# Patient Record
Sex: MALE | Race: OTHER | NOT HISPANIC OR LATINO | URBAN - METROPOLITAN AREA
[De-identification: names, ages, dates, MRNs, and addresses within clinical notes are randomized per-mention and may not be internally consistent; named-entity substitution may affect disease eponyms.]

---

## 2019-06-04 ENCOUNTER — EMERGENCY (EMERGENCY)
Facility: HOSPITAL | Age: 34
LOS: 1 days | Discharge: HOME | End: 2019-06-04
Attending: EMERGENCY MEDICINE | Admitting: EMERGENCY MEDICINE
Payer: SELF-PAY

## 2019-06-04 VITALS
DIASTOLIC BLOOD PRESSURE: 84 MMHG | TEMPERATURE: 98 F | OXYGEN SATURATION: 96 % | SYSTOLIC BLOOD PRESSURE: 143 MMHG | RESPIRATION RATE: 18 BRPM | HEART RATE: 112 BPM
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
## 2019-06-04 DIAGNOSIS — F10.129 ALCOHOL ABUSE WITH INTOXICATION, UNSPECIFIED: ICD-10-CM

## 2019-06-04 DIAGNOSIS — R41.82 ALTERED MENTAL STATUS, UNSPECIFIED: ICD-10-CM

## 2019-06-04 LAB — ETHANOL SERPL-MCNC: 229 MG/DL — HIGH

## 2019-06-04 PROCEDURE — 99053 MED SERV 10PM-8AM 24 HR FAC: CPT

## 2019-06-04 PROCEDURE — 99220: CPT | Mod: 25

## 2019-06-04 NOTE — ED PROVIDER NOTE - CLINICAL SUMMARY MEDICAL DECISION MAKING FREE TEXT BOX
34 y/o male BIB EMS for ETOH intoxication. Pt was picked up from outside a restaurant. Admits to heavy ETOH use today, no other complaints. Denies any fall or trauma. Unable to cooperate with remainder of history due to clinical condition/AMS.  Unable to walk or stand without assistance.  Will place in CDU for patient safety and offer SBIRT when sober.

## 2019-06-04 NOTE — ED CDU PROVIDER INITIAL DAY NOTE - OBJECTIVE STATEMENT
32 y/o male BIB EMS for ETOH intoxication. Pt was picked up from outside a restaurant. Admits to heavy ETOH use today, no other complaints. Denies any fall or trauma. Unable to cooperate with remainder of history due to clinical condition/AMS.

## 2019-06-04 NOTE — ED PROVIDER NOTE - OBJECTIVE STATEMENT
34 y/o male BIB EMS for ETOH intoxication. Pt was picked up from outside a restaurant. Admits to heavy ETOH use today, no other complaints. Denies any fall or trauma. Unable to cooperate with remainder of history due to clinical condition/AMS.

## 2019-06-04 NOTE — ED ADULT TRIAGE NOTE - CHIEF COMPLAINT QUOTE
Pt. picked up from outside a restaurant admits to drinking alcohol. In no distress, no s/s of fall or trauma

## 2019-06-04 NOTE — ED ADULT NURSE NOTE - CHPI ED NUR SYMPTOMS NEG
no disorientation/no nausea/no pain/no fever/no abdominal pain/no vomiting/no chills/no confusion/no weakness/no abdominal distension

## 2019-06-04 NOTE — ED ADULT NURSE NOTE - NSIMPLEMENTINTERV_GEN_ALL_ED
Implemented All Fall Risk Interventions:  [place] to call system. Call bell, personal items and telephone within reach. Instruct patient to call for assistance. Room bathroom lighting operational. Non-slip footwear when patient is off stretcher. Physically safe environment: no spills, clutter or unnecessary equipment. Stretcher in lowest position, wheels locked, appropriate side rails in place. Provide visual cue, wrist band, yellow gown, etc. Monitor gait and stability. Monitor for mental status changes and reorient to person, place, and time. Review medications for side effects contributing to fall risk. Reinforce activity limits and safety measures with patient and family.

## 2019-06-05 VITALS
RESPIRATION RATE: 17 BRPM | OXYGEN SATURATION: 96 % | HEART RATE: 90 BPM | DIASTOLIC BLOOD PRESSURE: 95 MMHG | SYSTOLIC BLOOD PRESSURE: 128 MMHG

## 2019-06-05 PROCEDURE — 99217: CPT | Mod: 25

## 2019-06-05 NOTE — ED CDU PROVIDER DISPOSITION NOTE - NSFOLLOWUPINSTRUCTIONS_ED_ALL_ED_FT
Please refrain from drinking alcohol.  Please call [facility] to schedule a primary care appointment this week.

## 2019-06-05 NOTE — ED CDU PROVIDER DISPOSITION NOTE - CLINICAL COURSE
BIBEMS intoxicated with alcohol.  Now with steady gait, clear speech and wishes to be discharged.  Declined SBIRT.

## 2024-06-07 NOTE — ED PROVIDER NOTE - CPE EDP RESP NORM
Called and left detailed message as we do not order med packs  Informed to check with pharmacy or new place where patient will be moving as to how they order this.  
Pt daughter Carmen, trying to move pt into assisted living.  Is looking into getting his diabetic medication shifted to a med pack.      Carmen is requesting a call back today as the patient will be moving very soon.    
normal...